# Patient Record
Sex: FEMALE | Race: BLACK OR AFRICAN AMERICAN | URBAN - METROPOLITAN AREA
[De-identification: names, ages, dates, MRNs, and addresses within clinical notes are randomized per-mention and may not be internally consistent; named-entity substitution may affect disease eponyms.]

---

## 2021-01-06 ENCOUNTER — OFFICE VISIT (OUTPATIENT)
Dept: ORTHOPEDIC SURGERY | Age: 51
End: 2021-01-06
Payer: COMMERCIAL

## 2021-01-06 VITALS — HEIGHT: 69 IN | TEMPERATURE: 97.6 F | BODY MASS INDEX: 25.92 KG/M2 | WEIGHT: 175 LBS

## 2021-01-06 DIAGNOSIS — M25.562 LEFT KNEE PAIN, UNSPECIFIED CHRONICITY: Primary | ICD-10-CM

## 2021-01-06 DIAGNOSIS — M17.12 PRIMARY OSTEOARTHRITIS OF LEFT KNEE: ICD-10-CM

## 2021-01-06 PROBLEM — N62 MACROMASTIA: Status: ACTIVE | Noted: 2017-09-20

## 2021-01-06 PROBLEM — I10 ESSENTIAL HYPERTENSION: Status: ACTIVE | Noted: 2020-12-10

## 2021-01-06 PROCEDURE — 99214 OFFICE O/P EST MOD 30 MIN: CPT | Performed by: ORTHOPAEDIC SURGERY

## 2021-01-06 RX ORDER — LEVOTHYROXINE SODIUM 0.05 MG/1
TABLET ORAL
COMMUNITY
Start: 2020-11-26

## 2021-01-06 RX ORDER — NORGESTIMATE AND ETHINYL ESTRADIOL 0.25-0.035
1 KIT ORAL DAILY
COMMUNITY
Start: 2020-09-28

## 2021-01-06 RX ORDER — HYDROCHLOROTHIAZIDE 25 MG/1
25 TABLET ORAL EVERY MORNING
COMMUNITY
Start: 2020-09-28

## 2021-01-06 RX ORDER — NORGESTIMATE AND ETHINYL ESTRADIOL 0.25-0.035
KIT ORAL
COMMUNITY
Start: 2020-12-12

## 2021-01-06 RX ORDER — HYDROCHLOROTHIAZIDE 25 MG/1
TABLET ORAL
COMMUNITY
Start: 2020-12-29

## 2021-01-06 RX ORDER — LEVOTHYROXINE SODIUM 0.07 MG/1
TABLET ORAL
COMMUNITY
Start: 2020-12-29

## 2021-01-06 RX ORDER — LEVOTHYROXINE SODIUM 0.07 MG/1
75 TABLET ORAL DAILY
COMMUNITY
Start: 2020-10-01

## 2021-01-06 SDOH — HEALTH STABILITY: MENTAL HEALTH: HOW OFTEN DO YOU HAVE A DRINK CONTAINING ALCOHOL?: NEVER

## 2021-01-06 NOTE — PROGRESS NOTES
Date:  2021    Name:  Faustino Torres  Address:  [address]  [facility] [address]    :  1970      Age:   45 y.o.    SSN:  [id number]      Medical Record Number:  [id number]    Reason for Visit:    Chief Complaint    Knee Pain (new patient left knee )      DOS:2021     HPI: Faustino Torres is a 45 y.o. female here today for evaluation of left knee pain that has been ongoing for several years with no associated injury. She has a history of ACL reconstruction after college. She was previously diagnosed with osteoarthritis several years ago and was given lubricant injection with resolution of symptoms for over a year. She has also had a cortisone injection but she had excruciating pain following and had a reaction. Pain Assessment  Location of Pain: Knee  Location Modifiers: Left  Severity of Pain: 6  Quality of Pain: Aching  Duration of Pain: A few minutes  Frequency of Pain: Intermittent  Aggravating Factors: Stairs(jogging)  Limiting Behavior: Yes  Relieving Factors: Rest  Result of Injury: No  Work-Related Injury: No  Are there other pain locations you wish to document?: No  ROS: Review of systems reviewed from Patient History Form completed today and available in the patient's chart under the Media tab. Past Medical History:   Diagnosis Date    Arthritis     Hypertension     Thyroid disease         No past surgical history on file. No family history on file.     Social History     Socioeconomic History    Marital status: Single     Spouse name: None    Number of children: None    Years of education: None    Highest education level: None   Occupational History    None   Social Needs    Financial resource strain: None    Food insecurity     Worry: None     Inability: None    Transportation needs     Medical: None     Non-medical: None   Tobacco Use    Smoking status: Never Smoker    Smokeless tobacco: Never Used   Substance and Sexual Activity    Alcohol use: Never Frequency: Never     Binge frequency: Never    Drug use: Never    Sexual activity: None   Lifestyle    Physical activity     Days per week: None     Minutes per session: None    Stress: None   Relationships    Social connections     Talks on phone: None     Gets together: None     Attends Synagogue service: None     Active member of club or organization: None     Attends meetings of clubs or organizations: None     Relationship status: None    Intimate partner violence     Fear of current or ex partner: None     Emotionally abused: None     Physically abused: None     Forced sexual activity: None   Other Topics Concern    None   Social History Narrative    None       Current Outpatient Medications   Medication Sig Dispense Refill    hydroCHLOROthiazide (HYDRODIURIL) 25 MG tablet Take 25 mg by mouth every morning      levothyroxine (SYNTHROID) 75 MCG tablet Take 75 mcg by mouth daily      norgestimate-ethinyl estradiol (ORTHO-CYCLEN) 0.25-35 MG-MCG per tablet Take 1 tablet by mouth daily      hydroCHLOROthiazide (HYDRODIURIL) 25 MG tablet TAKE 1 TABLET BY MOUTH EVERY MORNING      levothyroxine (SYNTHROID) 50 MCG tablet TK 1 T PO D      levothyroxine (SYNTHROID) 75 MCG tablet TAKE 1 TABLET BY MOUTH DAILY      ESTARYLLA 0.25-35 MG-MCG per tablet TAKE 1 TABLET BY MOUTH DAILY       No current facility-administered medications for this visit. No Known Allergies    Vital signs:  Temp 97.6 °F (36.4 °C)   Ht 5' 9\" (1.753 m)   Wt 175 lb (79.4 kg)   BMI 25.84 kg/m²          Neuro: Alert & oriented x 3,  normal,  no focal deficits noted. Normal affect. Eyes: sclera clear  Ears: Normal external ear  Mouth:  No perioral lesions  Pulm: Respirations unlabored and regular  Pulse: Extremities well perfused. 2+ peripheral pulses. Skin: Warm. No ulcerations. Constitutional: The physical examination finds the patient to be well-developed and well-nourished.   The patient is alert and oriented x3 and was cooperative throughout the visit. Left knee exam    Gait: No use of assistive devices. No antalgic gait. Alignment: normal alignment. Inspection/skin: Skin is intact without erythema or ecchymosis. No gross deformity. Well healed surgical incisions    Palpation: Crepitus. no joint line tenderness present. Range of Motion: There is full range of motion. Strength: Normal quadriceps development. Effusion: No effusion or swelling present. Ligamentous stability: No cruciate or collateral ligament instability. Neurologic and vascular: Skin is warm and well-perfused. Sensation is intact to light-touch. Special tests: Negative Camille sign. Right knee exam    Gait: No use of assistive devices. No antalgic gait. Alignment: normal alignment. Inspection/skin: Skin is intact without erythema or ecchymosis. No gross deformity. Palpation: mild crepitus. no joint line tenderness present. Range of Motion: There is full range of motion. Strength: Normal quadriceps development. Effusion: No effusion or swelling present. Ligamentous stability: No cruciate or collateral ligament instability. Neurologic and vascular: Skin is warm and well-perfused. Sensation is intact to light-touch. Special tests: Negative Camille sign. Diagnostics:  Radiology:       Radiographs were obtained and reviewed in the office; 4 views: bilateral PA, bilateral Izquierdo, bilateral Merchants AND Left lateral    Impression: Screws in good position. Medial joint space narrowing. Patellofemoral space narrowing. Bone spurs appreciated    Assessment: 44 yo female with left knee:  1. Patellofemoral osteoarthritis  2. S/p ACL reconstruction    Plan: Pertinent imaging was reviewed. The etiology, natural history, and treatment options for the disorder were discussed.   The roles of activity medication, antiinflammatories, injections, bracing, physical therapy, and surgical interventions were all described to the patient and questions were answered. Mrs. Beni Villegas has severe patellofemoral osteoarthritis refractory to activity modification, anti-inflammatories, insoles. She had a reaction to cortisone in the past but had very good relief with lubricant injections to the point where she has not seen anyone for her knees in over 7 years. I believe she is a candidate for repeat lubricant injections. She would like to proceed with this. We will put in for request with her insurance company. I will see her back for her lubricant injections. Carina Parker is in agreement with this plan. All questions were answered to patient's satisfaction and was encouraged to call with any further questions. Cecil Dias, [facility]  1/7/2021    During this exam, I, Cecil Dias PA-C, functioned as a scribe for Dr. Johanna Lundborg. The history taking and physical examination were performed by Dr. Johanna Lundborg. All counseling during the appointment was performed between the patient and Dr. Johanna Lundborg. 1/7/2021 7:03 PM    This dictation was performed with a verbal recognition program (DRAGON) and it was checked for errors. It is possible that there are still dictated errors within this office note. If so, please bring any areas to my attention for an addendum. All efforts were made to ensure that this office note is accurate. I personally reviewed the patient's pain scale, review of systems, family history, social history, past medical history, allergies and medications. Review of systems was collected today, reviewed and is included in the medical record. It is available under the media tab. I personally performed the services described in this documentation and scribed by GAURAV Telles MD  Sports Medicine, Arthroscopic Knee and Shoulder Surgery    This dictation was performed with a verbal recognition program [facility]) and it was checked for errors. It is possible that there are still dictated errors within this office note. If so, please bring any errors to my attention for an addendum. All efforts were made to ensure that this office note is accurate.

## 2021-01-08 ENCOUNTER — TELEPHONE (OUTPATIENT)
Dept: ORTHOPEDIC SURGERY | Age: 51
End: 2021-01-08

## 2021-01-08 NOTE — TELEPHONE ENCOUNTER
01/07/2021  AARON   (QTY 1)    LEFT KNEE. NO AUTHORIZATION REQUIRED. VALID & BILLABLE. CAN BUY& BILL. PER THELMA @ [facility], CALL REF [id number].   AP

## 2021-01-08 NOTE — TELEPHONE ENCOUNTER
CALLED PATIENT AND LEFT MESSAGE FOR HER TO CALL AT HER EARLIEST CONVENIENCE REGARDING HER INJECTION APPROVAL.

## 2021-01-20 ENCOUNTER — OFFICE VISIT (OUTPATIENT)
Dept: ORTHOPEDIC SURGERY | Age: 51
End: 2021-01-20
Payer: COMMERCIAL

## 2021-01-20 VITALS — BODY MASS INDEX: 25.92 KG/M2 | WEIGHT: 175 LBS | TEMPERATURE: 97.8 F | HEIGHT: 69 IN

## 2021-01-20 DIAGNOSIS — M17.12 PRIMARY OSTEOARTHRITIS OF LEFT KNEE: Primary | ICD-10-CM

## 2021-01-20 PROCEDURE — 99213 OFFICE O/P EST LOW 20 MIN: CPT | Performed by: ORTHOPAEDIC SURGERY

## 2021-01-20 PROCEDURE — 20610 DRAIN/INJ JOINT/BURSA W/O US: CPT | Performed by: ORTHOPAEDIC SURGERY

## 2021-01-20 NOTE — PROGRESS NOTES
Chief Complaint  Follow-up (left knee. durolane injection )      History of Present Illness:  Drew Cui is a pleasant 45 y.o. female here today for evaluation of left knee pain that has been ongoing for several years with no associated injury. She has a history of ACL reconstruction after college. She was previously diagnosed with osteoarthritis several years ago and was given lubricant injection with resolution of symptoms for over a year. She has also had a cortisone injection but she had excruciating pain following and had a reaction. Medical History:  Patient's medications, allergies, past medical, surgical, social and family histories were reviewed and updated as appropriate. Pain Assessment  Location of Pain: Knee  Location Modifiers: Left  Severity of Pain: 3  Quality of Pain: Aching  Duration of Pain: A few minutes  Frequency of Pain: Intermittent  Aggravating Factors: (jogging / jumping)  Limiting Behavior: Yes  Relieving Factors: Rest  Result of Injury: No  Work-Related Injury: No  Are there other pain locations you wish to document?: No  ROS: Review of systems reviewed from Patient History Form completed today and available in the patient's chart under the Media tab. Pertinent items are noted in HPI  Review of systems reviewed from Patient History Form completed today and available in the patient's chart under the Media tab. Vital Signs:  Temp 97.8 °F (36.6 °C)   Ht 5' 9\" (1.753 m)   Wt 175 lb (79.4 kg)   BMI 25.84 kg/m²         Neuro: Alert & oriented x 3,  normal,  no focal deficits noted. Normal affect. Eyes: sclera clear  Ears: Normal external ear  Mouth:  No perioral lesions  Pulm: Respirations unlabored and regular  Pulse: Extremities well perfused. 2+ peripheral pulses. Skin: Warm. No ulcerations. Constitutional: The physical examination finds the patient to be well-developed and well-nourished.   The patient is alert and oriented x3 and was cooperative throughout the visit.       Left knee exam     Gait: No use of assistive devices. No antalgic gait.     Alignment: normal alignment.     Inspection/skin: Skin is intact without erythema or ecchymosis. No gross deformity. Well healed surgical incisions     Palpation: Crepitus. no joint line tenderness present.     Range of Motion: There is full range of motion.      Strength: Normal quadriceps development.      Effusion: No effusion or swelling present.      Ligamentous stability: No cruciate or collateral ligament instability.      Neurologic and vascular: Skin is warm and well-perfused. Sensation is intact to light-touch.      Special tests: Negative Camille sign.         Right knee exam     Gait: No use of assistive devices. No antalgic gait.     Alignment: normal alignment.     Inspection/skin: Skin is intact without erythema or ecchymosis. No gross deformity.      Palpation: mild crepitus. no joint line tenderness present.     Range of Motion: There is full range of motion.      Strength: Normal quadriceps development.      Effusion: No effusion or swelling present.      Ligamentous stability: No cruciate or collateral ligament instability.      Neurologic and vascular: Skin is warm and well-perfused. Sensation is intact to light-touch.      Special tests: Negative Camille sign.        Radiology:       Pertinent imaging reviewed, images only - no report available. No new imaging was obtained during today's visit. Assessment: 44 yo female with left knee:  1. Patellofemoral osteoarthritis  2. S/p ACL reconstruction    Impression:  Encounter Diagnosis   Name Primary?  Primary osteoarthritis of left knee Yes       Office Procedures:  Orders Placed This Encounter   Procedures    AR ARTHROCENTESIS ASPIR&/INJ MAJOR JT/BURSA W/O US         Plan: Pertinent imaging was reviewed. The etiology, natural history, and treatment options for the disorder were discussed.   The roles of activity medication, antiinflammatories, injections, bracing, physical therapy, and surgical interventions were all described to the patient and questions were answered. Mrs. Radha Pike has severe patellofemoral osteoarthritis refractory to activity modification, anti-inflammatories, insoles. She had a reaction to cortisone in the past but had very good relief with lubricant injections to the point where she has not seen anyone for her knees in over 7 years. I believe she is a candidate for repeat lubricant injections. She would like to proceed with this. I will see her back if no improvement or worsening symptoms  Halley North is in agreement with this plan. All questions were answered to patient's satisfaction and was encouraged to call with any further questions. The risks benefits and alternatives to Durolane injection were discussed with the patient. The patient has undergone treatment with physical therapy anti-inflammatory medication and steroid injection in the past and has been unresponsive. X-rays confirm that there is significant osteoarthritis. After informed consent was obtained, the injection site was prepped with chlorhexidine following which the Durolane 60 mg/3 mL was placed into the left knee without complication. The patient was able to flex the knee to 90° immediately after the injection. The patient was advised to take it easy the next few days and ice and if there is any soreness. The patient was advised to contact us if any swelling, redness or increasing pain develops. All questions were answered and the patient will return for her next injection. Ryne Estrada, [address]  1/20/2021    During this exam, Ryne GRULLON PA-C, functioned as a scribe for Dr. Rochelle Bah. The history taking and physical examination were performed by Dr. Rochelle Bah. All counseling during the appointment was performed between the patient and Dr. Rochelle Bah.  1/20/2021 7:24 PM    This dictation was performed with a verbal recognition program (DRAGON) and it was checked for errors. It is possible that there are still dictated errors within this office note. If so, please bring any areas to my attention for an addendum. All efforts were made to ensure that this office note is accurate. I attest that I met personally with the patient, performed the described exam, reviewed the radiographic studies and medical records associated with this patient and supervised the services that are described above.      Aysha Cabral MD